# Patient Record
Sex: MALE | Race: WHITE | Employment: FULL TIME | URBAN - METROPOLITAN AREA
[De-identification: names, ages, dates, MRNs, and addresses within clinical notes are randomized per-mention and may not be internally consistent; named-entity substitution may affect disease eponyms.]

---

## 2020-11-09 ENCOUNTER — HOSPITAL ENCOUNTER (EMERGENCY)
Facility: CLINIC | Age: 50
Discharge: HOME | End: 2020-11-09
Attending: PHYSICIAN ASSISTANT | Admitting: PHYSICIAN ASSISTANT
Payer: COMMERCIAL

## 2020-11-09 VITALS
BODY MASS INDEX: 40.92 KG/M2 | WEIGHT: 261.25 LBS | DIASTOLIC BLOOD PRESSURE: 94 MMHG | OXYGEN SATURATION: 96 % | RESPIRATION RATE: 19 BRPM | HEART RATE: 84 BPM | SYSTOLIC BLOOD PRESSURE: 131 MMHG | TEMPERATURE: 97.8 F

## 2020-11-09 DIAGNOSIS — R73.9 HYPERGLYCEMIA: ICD-10-CM

## 2020-11-09 LAB
ANION GAP SERPL CALCULATED.3IONS-SCNC: 9 MMOL/L (ref 3–14)
BASE EXCESS BLDV CALC-SCNC: 3.4 MMOL/L
BASOPHILS # BLD AUTO: 0.1 10E9/L (ref 0–0.2)
BASOPHILS NFR BLD AUTO: 0.6 %
BUN SERPL-MCNC: 17 MG/DL (ref 7–30)
CALCIUM SERPL-MCNC: 8.9 MG/DL (ref 8.5–10.1)
CHLORIDE SERPL-SCNC: 101 MMOL/L (ref 94–109)
CO2 SERPL-SCNC: 27 MMOL/L (ref 20–32)
CREAT SERPL-MCNC: 0.93 MG/DL (ref 0.66–1.25)
DIFFERENTIAL METHOD BLD: NORMAL
EOSINOPHIL # BLD AUTO: 0.2 10E9/L (ref 0–0.7)
EOSINOPHIL NFR BLD AUTO: 1.8 %
ERYTHROCYTE [DISTWIDTH] IN BLOOD BY AUTOMATED COUNT: 11.9 % (ref 10–15)
GFR SERPL CREATININE-BSD FRML MDRD: >90 ML/MIN/{1.73_M2}
GLUCOSE BLDC GLUCOMTR-MCNC: 259 MG/DL (ref 70–99)
GLUCOSE BLDC GLUCOMTR-MCNC: 359 MG/DL (ref 70–99)
GLUCOSE SERPL-MCNC: 308 MG/DL (ref 70–99)
HCO3 BLDV-SCNC: 30 MMOL/L (ref 21–28)
HCT VFR BLD AUTO: 43.8 % (ref 40–53)
HGB BLD-MCNC: 15.2 G/DL (ref 13.3–17.7)
IMM GRANULOCYTES # BLD: 0 10E9/L (ref 0–0.4)
IMM GRANULOCYTES NFR BLD: 0.5 %
KETONES BLD-SCNC: 0.4 MMOL/L (ref 0–0.6)
LYMPHOCYTES # BLD AUTO: 3.2 10E9/L (ref 0.8–5.3)
LYMPHOCYTES NFR BLD AUTO: 36 %
MCH RBC QN AUTO: 32.3 PG (ref 26.5–33)
MCHC RBC AUTO-ENTMCNC: 34.7 G/DL (ref 31.5–36.5)
MCV RBC AUTO: 93 FL (ref 78–100)
MONOCYTES # BLD AUTO: 0.7 10E9/L (ref 0–1.3)
MONOCYTES NFR BLD AUTO: 7.6 %
NEUTROPHILS # BLD AUTO: 4.8 10E9/L (ref 1.6–8.3)
NEUTROPHILS NFR BLD AUTO: 53.5 %
NRBC # BLD AUTO: 0 10*3/UL
NRBC BLD AUTO-RTO: 0 /100
O2/TOTAL GAS SETTING VFR VENT: ABNORMAL %
OXYHGB MFR BLDV: 64 %
PCO2 BLDV: 52 MM HG (ref 40–50)
PH BLDV: 7.37 PH (ref 7.32–7.43)
PLATELET # BLD AUTO: 262 10E9/L (ref 150–450)
PO2 BLDV: 33 MM HG (ref 25–47)
POTASSIUM SERPL-SCNC: 3.8 MMOL/L (ref 3.4–5.3)
RBC # BLD AUTO: 4.71 10E12/L (ref 4.4–5.9)
SODIUM SERPL-SCNC: 137 MMOL/L (ref 133–144)
WBC # BLD AUTO: 8.9 10E9/L (ref 4–11)

## 2020-11-09 PROCEDURE — 96361 HYDRATE IV INFUSION ADD-ON: CPT

## 2020-11-09 PROCEDURE — 99283 EMERGENCY DEPT VISIT LOW MDM: CPT | Mod: 25

## 2020-11-09 PROCEDURE — 85025 COMPLETE CBC W/AUTO DIFF WBC: CPT | Performed by: PHYSICIAN ASSISTANT

## 2020-11-09 PROCEDURE — 96360 HYDRATION IV INFUSION INIT: CPT

## 2020-11-09 PROCEDURE — 999N001017 HC STATISTIC GLUCOSE BY METER IP

## 2020-11-09 PROCEDURE — 82010 KETONE BODYS QUAN: CPT | Performed by: PHYSICIAN ASSISTANT

## 2020-11-09 PROCEDURE — 80048 BASIC METABOLIC PNL TOTAL CA: CPT | Performed by: PHYSICIAN ASSISTANT

## 2020-11-09 PROCEDURE — 82805 BLOOD GASES W/O2 SATURATION: CPT | Performed by: PHYSICIAN ASSISTANT

## 2020-11-09 PROCEDURE — 258N000003 HC RX IP 258 OP 636: Performed by: PHYSICIAN ASSISTANT

## 2020-11-09 RX ORDER — CLOTRIMAZOLE 10 MG/1
10 LOZENGE ORAL
Qty: 35 LOZENGE | Refills: 0 | Status: SHIPPED | OUTPATIENT
Start: 2020-11-09 | End: 2020-11-16

## 2020-11-09 RX ADMIN — SODIUM CHLORIDE 1000 ML: 9 INJECTION, SOLUTION INTRAVENOUS at 18:10

## 2020-11-09 ASSESSMENT — ENCOUNTER SYMPTOMS
DIZZINESS: 1
APPETITE CHANGE: 1

## 2020-11-09 NOTE — ED AVS SNAPSHOT
[facility] Emergency Dept  [address]  [facility] [address]  Phone: [phone]  Fax: [phone]                                    Gage Shah   MRN: [id number]    Department: [facility] Emergency Dept   Date of Visit: 11/9/2020           After Visit Summary Signature Page    I have received my discharge instructions, and my questions have been answered. I have discussed any challenges I see with this plan with the nurse or doctor.    ..........................................................................................................................................  Patient/Patient Representative Signature      ..........................................................................................................................................  Patient Representative Print Name and Relationship to Patient    ..................................................               ................................................  Date                                   Time    ..........................................................................................................................................  Reviewed by Signature/Title    ...................................................              ..............................................  Date                                               Time          22EPIC Rev 08/18

## 2020-11-09 NOTE — ED PROVIDER NOTES
History     Chief Complaint:  Hyperglycemia      HPI   Gage Shah is a 50 year old male with a history of type II diabetes who presents for the evaluation of hyperglycemia. The patient reports that he was diagnosed with type II diabetes six months ago and was prescribed metformin 500 mg in the morning and 500 mg at night. The patient notes that for the past month he has not taken his metformin faithfully and that over the past week he has started to experience an unquenchable thirst, intermittent dizziness, and blurry vision. He states that today his blood sugar was the highest it had ever been at 509 when he woke up this morning, that he took 20 units of Lantus at 0700 due to this with a blood sugar recheck after two eggs and toast that was 489. He also states that his blood sugar trended down even more after another dose of 20 units of Lantus to 439. Patient does endorse blurry vision currently and states that his last dose of Lantus prior to today was on 10/8. Patient also notes that eight days ago he had a molar tooth pulled and that he had been amoxicillin for this for three total weeks with the last dose being a few days ago. The patient denies double vision, loss of vision, chest pain, and other issues.      Allergies:  No known drug allergies      Medications:    Prinivil  Lipitor  Lantus  Metformin    Past Medical History:    CKD  Hypertension  Diabetes, type II  Hepatic steatosis  Dyslipidemia  Gout  Regular astigmatism   Obesity   Renal stone    Past Surgical History:    History reviewed. No pertinent surgical history.     Family History:    History reviewed. No pertinent family history.      Social History:  Smoking status: Never smoker  Alcohol use: Yes   Drug use: No  PCP: Deneen Ocampo   Marital Status:   [2]     Review of Systems   Constitutional: Positive for appetite change.   Eyes: Positive for visual disturbance.        Blurry vision   Cardiovascular: Negative for chest  pain.   Neurological: Positive for dizziness.   All other systems reviewed and are negative.    Physical Exam     Patient Vitals for the past 24 hrs:   BP Temp Temp src Pulse Resp SpO2 Weight   11/09/20 1525 (!) 143/89 97.8  F (36.6  C) Temporal 92 19 95 % 118.5 kg (261 lb 3.9 oz)      Physical Exam  General: Alert and interactive. Appears well. Cooperative and pleasant.   Eyes: The pupils are equal and round. EOMs intact. No scleral icterus.  ENT: No abnormalities to the external nose or ears. Mucous membranes moist. Posterior oropharynx is non-erythematous. White discoloration to tongue. Second upper molar with recent extraction and healing sutures.   Neck: Trachea is in the midline. No nuchal rigidity.     CV: Regular rate and rhythm. S1 and S2 normal without murmur, click, gallop or rub.   Resp: Breath sounds are clear bilaterally, without rhonchi, wheezes, rales. Non-labored, no retractions or accessory muscle use.     GI: Abdomen is soft without distension. No tenderness to palpation. No peritoneal signs.    MS: Moving all extremities well. Good muscle tone.   Skin: Warm and dry. No rash or lesions noted.  Neuro: Alert and oriented x 3. No focal neurologic deficits. Good strength and sensation in upper and lower extremities.   Psych: Awake. Alert.  Normal affect. Appropriate interactions.  Lymph: No anterior or posterior cervical lymphadenopathy noted.    Emergency Department Course   Laboratory:  Glucose by meter (1528): 359 (H)  Glucose by meter (1932): 259    CBC: WNL (WBC 8.9, HGB 15.2, )  BMP: Glucose 308 (H), WNL (Creatinine 0.93)   Blood gas venous and oxyhgb: pCO2 52 (H), Bicarbonate 30 (H)   Ketone Beta-Hydroxybutyrate Quantitative: 0.4     Interventions:  1810, NS 1L IV Bolus       Emergency Department Course:  Past medical records, nursing notes, and vitals reviewed.  1732: I performed an exam of the patient and obtained history, as documented above.     IV inserted and blood drawn.     1857:  I rechecked the patient. Explained findings to patient.     1950: I rechecked patient and discussed findings.     Findings and plan explained to the Patient. Patient discharged home with instructions regarding supportive care, medications, and reasons to return. The importance of close follow-up was reviewed. The patient was prescribed Clotrimazole.     Impression & Plan    Medical Decision Making:  Gage Shah is a 50 year old male who presents for evaluation of elevated blood sugar. He has known TIIDM. By blood work there is no signs of DKA, no clinical features to suggest hyperosmolar issues. Hyperglycemia is likely in the setting of poor diet while hunting, non-compliance with Metformin, and recent dental infection and prolonged course of Amoxicillin. He took 40 units of his own Lantus due to glucose over 500 today, and he was educated on the danger of hypoglycemia as a results of overuse of insulin. Fortunately, he is beyond the peak action of Lantus and likely will not become hypoglycemic. Glucose trended down in Emergency Department here after fluid and is now below 300. He should continue his Metformin and healthy diet, but I have discussed not restarting Lantus until informed to do so by PCP. He will check glucose every hour or so at home, especially before bed, and will have glucose tablets at hand for glucose below 60. He also has signs of oral thrush and will be started on Clotrimazole troches. Patient to return for diaphoresis, confusion, headaches, other worrisome concerns.       Diagnosis:    ICD-10-CM    1. Hyperglycemia  R73.9 Glucose by meter     Glucose by meter       Disposition:  Discharged to home with clotrimazole troches    Discharge Medications:  New Prescriptions    CLOTRIMAZOLE (MYCELEX) 10 MG LOZENGE    Place 1 lozenge (10 mg) inside cheek 5 times daily for 7 days     Scribe Disclosure:  I, Johnny Aceves, am serving as a scribe at 5:17 PM on 11/9/2020 to document services personally  performed by Katie Akhtar PA-C based on my observations and the provider's statements to me.      Johnny Aceves  11/9/2020   [facility] EMERGENCY DEPT       Katie Akhtar PA-C  11/09/20 2001

## 2020-11-09 NOTE — ED TRIAGE NOTES
"Thirst x 3 days while hunting, checked BG when home with readings over 500. Reports not taking lantus and metformin while hunting. Now reports \"foggy\" thinking and continued thirst. VSS on RA. A&O x 4.     Addendum: Pt additionally reports recently having infected tooth pulled with bone graft placement and recently completing ABX.   "

## 2020-11-10 NOTE — DISCHARGE INSTRUCTIONS
Don't take Lantus until further direction from primary care.   Restart healthy eating habits and Metformin in the morning and evening.   Drink lots of water.   Call primary care for immediate follow up.   Discharge Instructions  Hyperglycemia, High Blood Sugar    Today we found your blood sugar (glucose) was high. This may mean that you have developed diabetes, or if you already know that you have diabetes, it may mean that your diabetes is not as well controlled as it should be. Sometimes blood sugar can be high temporarily and it is not diabetes. Signs of elevated blood sugar include increased thirst, frequent urination (peeing), blurred vision, fatigue, unexplained weight loss, poor wound healing, and frequent infections.    We sometimes give medicine in the Emergency Department to lower the blood sugar. We may also prescribe medicine for you to use at home, or increase the medicine that you already take. While we do not like to see your blood sugar high, it is much more dangerous to let your blood sugar get too low, so it is reasonable to take time to bring it down, or to wait and watch to see if it comes down on its own.    Generally, every Emergency Department visit should have a follow-up clinic visit with either a primary or a specialty clinic/provider. Please follow-up as instructed by your emergency provider today.     Return to the Emergency Department if you develop:  Vomiting (throwing up).  Confusion, disorientation, or being unable to wake up.  Severe weakness or illness.  Abdominal (belly) pain.    What can I do to help myself?  Check your blood sugar as instructed by your provider.  Take medications prescribed by your provider.  Follow a diabetic diet (low fat, low concentrated sweets, high fiber).  Exercise regularly.  Moderate or eliminate alcohol use.  Stop smoking.    Diabetes: Diabetes mellitus is a disease in which the body cannot regulate the amount of sugar (glucose) in the blood. Insulin  allows glucose to move out of the blood into cells throughout the body where it is used for fuel. People with diabetes do not produce enough insulin (type 1 diabetes), or cannot use insulin properly (type 2 diabetes), or both. This starves the cells that need the glucose for fuel, and also harms certain organs and tissues exposed to the high glucose levels.  Over a long period of time, uncontrolled diabetes can lead to heart and blood vessel disease, blindness, kidney failure, foot ulcers and many other problems.          About 17 million Americans (6.2% of adults) have diabetes. We think that about one third of adults with diabetes do not know they have diabetes.  The incidence of diabetes is increasing rapidly. This increase is due to many factors, but the most significant are our increasing weight and decreased activity levels.     Diabetes can be a very serious and life-threatening illness if not treated.  If you were given a prescription for medicine here today, be sure to read all of the information (including the package insert) that comes with your prescription.  This will include important information about the medicine, its side effects, and any warnings that you need to know about.  The pharmacist who fills the prescription can provide more information and answer questions you may have about the medicine.  If you have questions or concerns that the pharmacist cannot address, please call or return to the Emergency Department.   Remember that you can always come back to the Emergency Department if you are not able to see your regular provider in the amount of time listed above, if you get any new symptoms, or if there is anything that worries you.